# Patient Record
Sex: MALE | Race: WHITE | NOT HISPANIC OR LATINO | URBAN - METROPOLITAN AREA
[De-identification: names, ages, dates, MRNs, and addresses within clinical notes are randomized per-mention and may not be internally consistent; named-entity substitution may affect disease eponyms.]

---

## 2020-07-25 ENCOUNTER — TELEPHONE ENCOUNTER (OUTPATIENT)
Dept: URBAN - METROPOLITAN AREA CLINIC 13 | Facility: CLINIC | Age: 55
End: 2020-07-25

## 2020-07-25 RX ORDER — DEXLANSOPRAZOLE 60 MG/1
TAKE 1 CAPSULE DAILY CAPSULE, DELAYED RELEASE ORAL
Refills: 0 | OUTPATIENT
End: 2017-11-30

## 2020-07-25 RX ORDER — LINACLOTIDE 72 UG/1
TAKE 1 CAPSULE BY MOUTH DAILY 30 MINUTES BEFORE BREAKFAST CAPSULE, GELATIN COATED ORAL
Qty: 30 | Refills: 5 | OUTPATIENT
Start: 2018-09-24 | End: 2019-01-16

## 2020-07-25 RX ORDER — POLYETHYLENE GLYCOL 3350, SODIUM CHLORIDE, SODIUM BICARBONATE AND POTASSIUM CHLORIDE WITH LEMON FLAVOR 420; 11.2; 5.72; 1.48 G/4L; G/4L; G/4L; G/4L
TAKE AS DIRECTED POWDER, FOR SOLUTION ORAL
Qty: 1 | Refills: 0 | OUTPATIENT
Start: 2017-11-30 | End: 2017-12-21

## 2020-07-25 RX ORDER — MELOXICAM 7.5 MG/1
TAKE 1 TABLET TWICE DAILY AS NEEDED TABLET ORAL
Qty: 180 | Refills: 3 | OUTPATIENT
Start: 2017-11-30 | End: 2018-09-24

## 2020-07-25 RX ORDER — POLYETHYLENE GLYCOL 3350, SODIUM SULFATE, SODIUM CHLORIDE, POTASSIUM CHLORIDE, ASCORBIC ACID, SODIUM ASCORBATE 7.5-2.691G
USE AS DIRECTED KIT ORAL
Qty: 1 | Refills: 0 | OUTPATIENT
Start: 2017-11-01 | End: 2017-11-30

## 2020-07-26 ENCOUNTER — TELEPHONE ENCOUNTER (OUTPATIENT)
Dept: URBAN - METROPOLITAN AREA CLINIC 13 | Facility: CLINIC | Age: 55
End: 2020-07-26

## 2020-07-26 RX ORDER — PREDNISONE 10 MG/1
TABLET ORAL
Qty: 7 | Refills: 0 | Status: ACTIVE | COMMUNITY
Start: 2019-05-13

## 2020-07-26 RX ORDER — MELOXICAM 15 MG/1
TAKE 1 TABLET BY MOUTH TWICE A DAY AS NEEDED TABLET ORAL
Qty: 180 | Refills: 0 | Status: ACTIVE | COMMUNITY
Start: 2018-01-23

## 2020-07-26 RX ORDER — GABAPENTIN 100 MG/1
TAKE 1 TO 3 CAPSULES BY MOUTH UP TO 3 TIMES A DAY CAPSULE ORAL
Qty: 270 | Refills: 0 | Status: ACTIVE | COMMUNITY
Start: 2017-12-11

## 2020-07-26 RX ORDER — GABAPENTIN 100 MG/1
CAPSULE ORAL
Qty: 270 | Refills: 0 | Status: ACTIVE | COMMUNITY
Start: 2018-11-04

## 2020-07-26 RX ORDER — AMOXICILLIN AND CLAVULANATE POTASSIUM 875; 125 MG/1; MG/1
TABLET, FILM COATED ORAL
Qty: 20 | Refills: 0 | Status: ACTIVE | COMMUNITY
Start: 2019-05-13

## 2020-07-26 RX ORDER — AZITHROMYCIN DIHYDRATE 250 MG/1
TAKE 2 TABLETS BY MOUTH TODAY, THEN TAKE 1 TABLET DAILY FOR 4 DAYS TABLET, FILM COATED ORAL
Qty: 6 | Refills: 0 | Status: ACTIVE | COMMUNITY
Start: 2017-12-11

## 2020-07-26 RX ORDER — CYCLOBENZAPRINE HYDROCHLORIDE 10 MG/1
TABLET, FILM COATED ORAL
Qty: 30 | Refills: 0 | Status: ACTIVE | COMMUNITY
Start: 2018-02-19

## 2020-07-26 RX ORDER — PANTOPRAZOLE SODIUM 40 MG/1
TAKE 1 TABLET BY MOUTH EVERY DAY TABLET, DELAYED RELEASE ORAL
Qty: 90 | Refills: 3 | Status: ACTIVE | COMMUNITY
Start: 2017-11-30

## 2020-07-26 RX ORDER — OSELTAMIVIR PHOSPHATE 75 MG/1
CAPSULE ORAL
Qty: 10 | Refills: 0 | Status: ACTIVE | COMMUNITY
Start: 2018-11-05

## 2020-07-26 RX ORDER — MELOXICAM 15 MG/1
TABLET ORAL
Qty: 180 | Refills: 0 | Status: ACTIVE | COMMUNITY
Start: 2018-10-06

## 2020-07-26 RX ORDER — PREDNISONE 10 MG/1
TABLET ORAL
Qty: 10 | Refills: 0 | Status: ACTIVE | COMMUNITY
Start: 2018-03-19

## 2020-07-26 RX ORDER — AZITHROMYCIN DIHYDRATE 250 MG/1
TABLET, FILM COATED ORAL
Qty: 6 | Refills: 0 | Status: ACTIVE | COMMUNITY
Start: 2018-10-05

## 2020-07-26 RX ORDER — MELOXICAM 15 MG/1
TABLET ORAL
Qty: 30 | Refills: 0 | Status: ACTIVE | COMMUNITY
Start: 2019-07-19

## 2020-07-26 RX ORDER — PSYLLIUM SEED
USE AS DIRECTED PACKET (EA) ORAL
Refills: 0 | Status: ACTIVE | COMMUNITY

## 2021-03-05 ENCOUNTER — WEB ENCOUNTER (OUTPATIENT)
Dept: URBAN - METROPOLITAN AREA CLINIC 113 | Facility: CLINIC | Age: 56
End: 2021-03-05

## 2021-03-05 ENCOUNTER — OFFICE VISIT (OUTPATIENT)
Dept: URBAN - METROPOLITAN AREA CLINIC 113 | Facility: CLINIC | Age: 56
End: 2021-03-05
Payer: OTHER GOVERNMENT

## 2021-03-05 VITALS
DIASTOLIC BLOOD PRESSURE: 69 MMHG | HEART RATE: 58 BPM | HEIGHT: 70 IN | BODY MASS INDEX: 35.36 KG/M2 | WEIGHT: 247 LBS | SYSTOLIC BLOOD PRESSURE: 171 MMHG | TEMPERATURE: 98 F

## 2021-03-05 DIAGNOSIS — K31.4 GASTRIC DIVERTICULUM: ICD-10-CM

## 2021-03-05 DIAGNOSIS — R10.31 RLQ ABDOMINAL PAIN: ICD-10-CM

## 2021-03-05 DIAGNOSIS — Z86.010 HISTORY OF COLON POLYPS: ICD-10-CM

## 2021-03-05 PROCEDURE — 99213 OFFICE O/P EST LOW 20 MIN: CPT | Performed by: INTERNAL MEDICINE

## 2021-03-05 RX ORDER — OSELTAMIVIR PHOSPHATE 75 MG/1
CAPSULE ORAL
Qty: 10 | Refills: 0 | Status: ON HOLD | COMMUNITY
Start: 2018-11-05

## 2021-03-05 RX ORDER — AMOXICILLIN AND CLAVULANATE POTASSIUM 875; 125 MG/1; MG/1
TABLET, FILM COATED ORAL
Qty: 20 | Refills: 0 | Status: ON HOLD | COMMUNITY
Start: 2019-05-13

## 2021-03-05 RX ORDER — PREDNISONE 10 MG/1
TABLET ORAL
Qty: 10 | Refills: 0 | Status: ON HOLD | COMMUNITY
Start: 2018-03-19

## 2021-03-05 RX ORDER — PSYLLIUM SEED
USE AS DIRECTED PACKET (EA) ORAL
Refills: 0 | Status: ON HOLD | COMMUNITY

## 2021-03-05 RX ORDER — AZITHROMYCIN DIHYDRATE 250 MG/1
TAKE 2 TABLETS BY MOUTH TODAY, THEN TAKE 1 TABLET DAILY FOR 4 DAYS TABLET, FILM COATED ORAL
Qty: 6 | Refills: 0 | Status: ON HOLD | COMMUNITY
Start: 2017-12-11

## 2021-03-05 RX ORDER — CYCLOBENZAPRINE HYDROCHLORIDE 10 MG/1
TABLET, FILM COATED ORAL
Qty: 30 | Refills: 0 | Status: ON HOLD | COMMUNITY
Start: 2018-02-19

## 2021-03-05 RX ORDER — MELOXICAM 15 MG/1
TAKE 1 TABLET BY MOUTH TWICE A DAY AS NEEDED TABLET ORAL
Qty: 180 | Refills: 0 | Status: ON HOLD | COMMUNITY
Start: 2018-01-23

## 2021-03-05 RX ORDER — GABAPENTIN 100 MG/1
TAKE 1 TO 3 CAPSULES BY MOUTH UP TO 3 TIMES A DAY CAPSULE ORAL
Qty: 270 | Refills: 0 | Status: ON HOLD | COMMUNITY
Start: 2017-12-11

## 2021-03-05 RX ORDER — PANTOPRAZOLE SODIUM 40 MG/1
TAKE 1 TABLET BY MOUTH EVERY DAY TABLET, DELAYED RELEASE ORAL
Qty: 90 | Refills: 3 | Status: ON HOLD | COMMUNITY
Start: 2017-11-30

## 2021-03-05 NOTE — EXAM-PHYSICAL EXAM
He is alert and oriented to person place and situation no acute distress.  Abdomen is soft nondistended and nontender.   exam does not reveal any evidence for inguinal hernia on the right side.

## 2021-03-05 NOTE — HPI-TODAY'S VISIT:
The patient returns today for follow-up.  I last saw the patient in August 2019.  He had a history of a large fundic diverticulum.  He had right upper quadrant pain at that time.  There was concern that he had pills sitting in this diverticulum causing symptomatology.  He initially came to me in 2017 for evaluation of abdominal pain.  Last colonoscopy was December 2017.  This was the patient's first colonoscopy.  Patient had 2 diminutive polyps.  He was recommended a 5-year follow-up.  Last upper endoscopy was in July 2019.  This revealed some gastric erosions in the large diverticulum in the fundus of the stomach.  Biopsies of the gastric erosions were negative.  Last imaging study was December 2018 which was a CT scan with contrast.  This was unremarkable. The patient comes in with a new problem.  He states over the past few weeks he has had this vague right lower quadrant discomfort that goes around to the right flank.  Is been associated with low-grade fevers.  No diarrhea or constipation.  The discomfort is not associated with bowel movements or eating.  He is starting to feel a little bit better.  He is concerned that he may have had Covid in the past month as he was having some upper respiratory issues which are getting better.  He does have a rash but he states this is been chronic and he sees dermatology for this.

## 2021-03-30 ENCOUNTER — TELEPHONE ENCOUNTER (OUTPATIENT)
Dept: URBAN - METROPOLITAN AREA CLINIC 113 | Facility: CLINIC | Age: 56
End: 2021-03-30

## 2021-04-15 ENCOUNTER — OFFICE VISIT (OUTPATIENT)
Dept: URBAN - METROPOLITAN AREA CLINIC 107 | Facility: CLINIC | Age: 56
End: 2021-04-15

## 2021-06-15 ENCOUNTER — TELEPHONE ENCOUNTER (OUTPATIENT)
Dept: URBAN - METROPOLITAN AREA CLINIC 113 | Facility: CLINIC | Age: 56
End: 2021-06-15

## 2021-11-15 ENCOUNTER — TELEPHONE ENCOUNTER (OUTPATIENT)
Dept: URBAN - METROPOLITAN AREA CLINIC 113 | Facility: CLINIC | Age: 56
End: 2021-11-15

## 2021-12-30 ENCOUNTER — OFFICE VISIT (OUTPATIENT)
Dept: URBAN - METROPOLITAN AREA CLINIC 113 | Facility: CLINIC | Age: 56
End: 2021-12-30

## 2022-08-22 ENCOUNTER — OFFICE VISIT (OUTPATIENT)
Dept: URBAN - METROPOLITAN AREA CLINIC 107 | Facility: CLINIC | Age: 57
End: 2022-08-22
Payer: OTHER GOVERNMENT

## 2022-08-22 ENCOUNTER — TELEPHONE ENCOUNTER (OUTPATIENT)
Dept: URBAN - METROPOLITAN AREA CLINIC 113 | Facility: CLINIC | Age: 57
End: 2022-08-22

## 2022-08-22 ENCOUNTER — LAB OUTSIDE AN ENCOUNTER (OUTPATIENT)
Dept: URBAN - METROPOLITAN AREA CLINIC 107 | Facility: CLINIC | Age: 57
End: 2022-08-22

## 2022-08-22 VITALS
HEART RATE: 60 BPM | DIASTOLIC BLOOD PRESSURE: 88 MMHG | TEMPERATURE: 97.6 F | WEIGHT: 242 LBS | BODY MASS INDEX: 34.65 KG/M2 | SYSTOLIC BLOOD PRESSURE: 164 MMHG | HEIGHT: 70 IN

## 2022-08-22 DIAGNOSIS — M54.50 ACUTE RIGHT-SIDED LOW BACK PAIN, UNSPECIFIED WHETHER SCIATICA PRESENT: ICD-10-CM

## 2022-08-22 DIAGNOSIS — K31.4 GASTRIC DIVERTICULUM: ICD-10-CM

## 2022-08-22 DIAGNOSIS — Z86.010 HISTORY OF COLON POLYPS: ICD-10-CM

## 2022-08-22 DIAGNOSIS — R10.31 RLQ ABDOMINAL PAIN: ICD-10-CM

## 2022-08-22 PROCEDURE — 99214 OFFICE O/P EST MOD 30 MIN: CPT | Performed by: INTERNAL MEDICINE

## 2022-08-22 RX ORDER — PREDNISONE 10 MG/1
TABLET ORAL
Qty: 10 | Refills: 0 | Status: ON HOLD | COMMUNITY
Start: 2018-03-19

## 2022-08-22 RX ORDER — OSELTAMIVIR PHOSPHATE 75 MG/1
CAPSULE ORAL
Qty: 10 | Refills: 0 | Status: ON HOLD | COMMUNITY
Start: 2018-11-05

## 2022-08-22 RX ORDER — OMEPRAZOLE 20 MG/1
1 CAPSULE 30 MINUTES BEFORE MORNING MEAL CAPSULE, DELAYED RELEASE ORAL ONCE A DAY
Qty: 90 | Refills: 3 | OUTPATIENT
Start: 2022-08-22

## 2022-08-22 RX ORDER — CYCLOBENZAPRINE HYDROCHLORIDE 10 MG/1
TABLET, FILM COATED ORAL
Qty: 30 | Refills: 0 | Status: ON HOLD | COMMUNITY
Start: 2018-02-19

## 2022-08-22 RX ORDER — AMOXICILLIN AND CLAVULANATE POTASSIUM 875; 125 MG/1; MG/1
TABLET, FILM COATED ORAL
Qty: 20 | Refills: 0 | Status: ON HOLD | COMMUNITY
Start: 2019-05-13

## 2022-08-22 RX ORDER — GABAPENTIN 100 MG/1
TAKE 1 TO 3 CAPSULES BY MOUTH UP TO 3 TIMES A DAY CAPSULE ORAL
Qty: 270 | Refills: 0 | Status: ON HOLD | COMMUNITY
Start: 2017-12-11

## 2022-08-22 RX ORDER — PSYLLIUM SEED
USE AS DIRECTED PACKET (EA) ORAL
Refills: 0 | Status: ON HOLD | COMMUNITY

## 2022-08-22 RX ORDER — PANTOPRAZOLE SODIUM 40 MG/1
TAKE 1 TABLET BY MOUTH EVERY DAY TABLET, DELAYED RELEASE ORAL
Qty: 90 | Refills: 3 | Status: ON HOLD | COMMUNITY
Start: 2017-11-30

## 2022-08-22 RX ORDER — AZITHROMYCIN DIHYDRATE 250 MG/1
TAKE 2 TABLETS BY MOUTH TODAY, THEN TAKE 1 TABLET DAILY FOR 4 DAYS TABLET, FILM COATED ORAL
Qty: 6 | Refills: 0 | Status: ON HOLD | COMMUNITY
Start: 2017-12-11

## 2022-08-22 RX ORDER — MELOXICAM 15 MG/1
TAKE 1 TABLET BY MOUTH TWICE A DAY AS NEEDED TABLET ORAL
Qty: 180 | Refills: 0 | Status: ON HOLD | COMMUNITY
Start: 2018-01-23

## 2022-08-22 RX ORDER — SODIUM PICOSULFATE, MAGNESIUM OXIDE, AND ANHYDROUS CITRIC ACID 10; 3.5; 12 MG/160ML; G/160ML; G/160ML
160 ML LIQUID ORAL
Qty: 320 MILLILITER | Refills: 0 | OUTPATIENT
Start: 2022-08-22 | End: 2022-08-23

## 2022-08-22 NOTE — HPI-TODAY'S VISIT:
The patient returns today for follow-up.  I last saw the patient in August 2019.  He had a history of a large fundic diverticulum.  He had right upper quadrant pain at that time.  There was concern that he had pills sitting in this diverticulum causing symptomatology.  He initially came to me in 2017 for evaluation of abdominal pain.  Last colonoscopy was December 2017.  This was the patient's first colonoscopy.  Patient had 2 diminutive polyps.  He was recommended a 5-year follow-up.  Last upper endoscopy was in July 2019.  This revealed some gastric erosions in the large diverticulum in the fundus of the stomach.  Biopsies of the gastric erosions were negative.  Last imaging study was December 2018 which was a CT scan with contrast.  This was unremarkable. The patient comes in with a new problem.  He states over the past few weeks he has had this vague right lower quadrant discomfort that goes around to the right flank.  Is been associated with low-grade fevers.  No diarrhea or constipation.  The discomfort is not associated with bowel movements or eating.  He is starting to feel a little bit better.  He is concerned that he may have had Covid in the past month as he was having some upper respiratory issues which are getting better.  He does have a rash but he states this is been chronic and he sees dermatology for this. Interval history.  The patient states from a gastrointestinal standpoint he is done quite well over the last year with no new gastrointestinal issues.  He states though over the last 3 weeks he has developed severe low back pain.  He is asking for referral to neurosurgery.  He states he has seen his primary care physician and that he has been placed on prednisone and a nonsteroidal anti-inflammatory agent.  He also takes meloxicam.

## 2022-11-17 ENCOUNTER — OFFICE VISIT (OUTPATIENT)
Dept: URBAN - METROPOLITAN AREA SURGERY CENTER 25 | Facility: SURGERY CENTER | Age: 57
End: 2022-11-17

## 2022-11-17 ENCOUNTER — CLAIMS CREATED FROM THE CLAIM WINDOW (OUTPATIENT)
Dept: URBAN - METROPOLITAN AREA CLINIC 4 | Facility: CLINIC | Age: 57
End: 2022-11-17
Payer: OTHER GOVERNMENT

## 2022-11-17 ENCOUNTER — CLAIMS CREATED FROM THE CLAIM WINDOW (OUTPATIENT)
Dept: URBAN - METROPOLITAN AREA SURGERY CENTER 25 | Facility: SURGERY CENTER | Age: 57
End: 2022-11-17
Payer: OTHER GOVERNMENT

## 2022-11-17 DIAGNOSIS — D12.6 COLON ADENOMAS: ICD-10-CM

## 2022-11-17 DIAGNOSIS — Z86.010 HISTORY OF COLON POLYPS: ICD-10-CM

## 2022-11-17 DIAGNOSIS — K63.5 HYPERPLASTIC COLON POLYP: ICD-10-CM

## 2022-11-17 DIAGNOSIS — D12.2 BENIGN NEOPLASM OF ASCENDING COLON: ICD-10-CM

## 2022-11-17 DIAGNOSIS — D12.3 BENIGN NEOPLASM OF TRANSVERSE COLON: ICD-10-CM

## 2022-11-17 PROCEDURE — 45385 COLONOSCOPY W/LESION REMOVAL: CPT | Performed by: INTERNAL MEDICINE

## 2022-11-17 PROCEDURE — G8907 PT DOC NO EVENTS ON DISCHARG: HCPCS | Performed by: INTERNAL MEDICINE

## 2022-11-17 PROCEDURE — 45380 COLONOSCOPY AND BIOPSY: CPT | Performed by: INTERNAL MEDICINE

## 2022-11-17 PROCEDURE — 88305 TISSUE EXAM BY PATHOLOGIST: CPT | Performed by: PATHOLOGY

## 2022-11-17 RX ORDER — PANTOPRAZOLE SODIUM 40 MG/1
TAKE 1 TABLET BY MOUTH EVERY DAY TABLET, DELAYED RELEASE ORAL
Qty: 90 | Refills: 3 | Status: ON HOLD | COMMUNITY
Start: 2017-11-30

## 2022-11-17 RX ORDER — OMEPRAZOLE 20 MG/1
1 CAPSULE 30 MINUTES BEFORE MORNING MEAL CAPSULE, DELAYED RELEASE ORAL ONCE A DAY
Qty: 90 | Refills: 3 | Status: ACTIVE | COMMUNITY
Start: 2022-08-22

## 2022-11-17 RX ORDER — OSELTAMIVIR PHOSPHATE 75 MG/1
CAPSULE ORAL
Qty: 10 | Refills: 0 | Status: ON HOLD | COMMUNITY
Start: 2018-11-05

## 2022-11-17 RX ORDER — PSYLLIUM SEED
USE AS DIRECTED PACKET (EA) ORAL
Refills: 0 | Status: ON HOLD | COMMUNITY

## 2022-11-17 RX ORDER — MELOXICAM 15 MG/1
TAKE 1 TABLET BY MOUTH TWICE A DAY AS NEEDED TABLET ORAL
Qty: 180 | Refills: 0 | Status: ON HOLD | COMMUNITY
Start: 2018-01-23

## 2022-11-17 RX ORDER — GABAPENTIN 100 MG/1
TAKE 1 TO 3 CAPSULES BY MOUTH UP TO 3 TIMES A DAY CAPSULE ORAL
Qty: 270 | Refills: 0 | Status: ON HOLD | COMMUNITY
Start: 2017-12-11

## 2022-11-17 RX ORDER — AMOXICILLIN AND CLAVULANATE POTASSIUM 875; 125 MG/1; MG/1
TABLET, FILM COATED ORAL
Qty: 20 | Refills: 0 | Status: ON HOLD | COMMUNITY
Start: 2019-05-13

## 2022-11-17 RX ORDER — PREDNISONE 10 MG/1
TABLET ORAL
Qty: 10 | Refills: 0 | Status: ON HOLD | COMMUNITY
Start: 2018-03-19

## 2022-11-17 RX ORDER — CYCLOBENZAPRINE HYDROCHLORIDE 10 MG/1
TABLET, FILM COATED ORAL
Qty: 30 | Refills: 0 | Status: ON HOLD | COMMUNITY
Start: 2018-02-19

## 2022-11-17 RX ORDER — AZITHROMYCIN DIHYDRATE 250 MG/1
TAKE 2 TABLETS BY MOUTH TODAY, THEN TAKE 1 TABLET DAILY FOR 4 DAYS TABLET, FILM COATED ORAL
Qty: 6 | Refills: 0 | Status: ON HOLD | COMMUNITY
Start: 2017-12-11

## 2022-12-27 PROBLEM — 428283002: Status: ACTIVE | Noted: 2021-03-05

## 2023-06-26 ENCOUNTER — DASHBOARD ENCOUNTERS (OUTPATIENT)
Age: 58
End: 2023-06-26

## 2023-06-26 ENCOUNTER — OFFICE VISIT (OUTPATIENT)
Dept: URBAN - METROPOLITAN AREA CLINIC 113 | Facility: CLINIC | Age: 58
End: 2023-06-26
Payer: OTHER GOVERNMENT

## 2023-06-26 VITALS
HEART RATE: 59 BPM | SYSTOLIC BLOOD PRESSURE: 164 MMHG | BODY MASS INDEX: 33.93 KG/M2 | DIASTOLIC BLOOD PRESSURE: 91 MMHG | WEIGHT: 237 LBS | TEMPERATURE: 97.8 F | RESPIRATION RATE: 16 BRPM | HEIGHT: 70 IN

## 2023-06-26 DIAGNOSIS — Z86.010 HISTORY OF COLON POLYPS: ICD-10-CM

## 2023-06-26 DIAGNOSIS — R10.31 RLQ ABDOMINAL PAIN: ICD-10-CM

## 2023-06-26 DIAGNOSIS — K31.4 GASTRIC DIVERTICULUM: ICD-10-CM

## 2023-06-26 DIAGNOSIS — B02.8 HERPES ZOSTER WITH COMPLICATION: ICD-10-CM

## 2023-06-26 PROCEDURE — 99214 OFFICE O/P EST MOD 30 MIN: CPT | Performed by: INTERNAL MEDICINE

## 2023-06-26 RX ORDER — MELOXICAM 15 MG/1
TAKE 1 TABLET BY MOUTH TWICE A DAY AS NEEDED TABLET ORAL
Qty: 180 | Refills: 0 | Status: ON HOLD | COMMUNITY
Start: 2018-01-23

## 2023-06-26 RX ORDER — PANTOPRAZOLE SODIUM 40 MG/1
TAKE 1 TABLET BY MOUTH EVERY DAY TABLET, DELAYED RELEASE ORAL
Qty: 90 | Refills: 3 | Status: ON HOLD | COMMUNITY
Start: 2017-11-30

## 2023-06-26 RX ORDER — AZITHROMYCIN DIHYDRATE 250 MG/1
TAKE 2 TABLETS BY MOUTH TODAY, THEN TAKE 1 TABLET DAILY FOR 4 DAYS TABLET, FILM COATED ORAL
Qty: 6 | Refills: 0 | Status: ON HOLD | COMMUNITY
Start: 2017-12-11

## 2023-06-26 RX ORDER — PREDNISONE 10 MG/1
TABLET ORAL
Qty: 10 | Refills: 0 | Status: ON HOLD | COMMUNITY
Start: 2018-03-19

## 2023-06-26 RX ORDER — OSELTAMIVIR PHOSPHATE 75 MG/1
CAPSULE ORAL
Qty: 10 | Refills: 0 | Status: ON HOLD | COMMUNITY
Start: 2018-11-05

## 2023-06-26 RX ORDER — VALACYCLOVIR 1 G/1
1 TABLET TABLET, FILM COATED ORAL THREE TIMES A DAY
Qty: 21 TABLET | Refills: 0 | OUTPATIENT
Start: 2023-06-26 | End: 2023-07-06

## 2023-06-26 RX ORDER — OMEPRAZOLE 20 MG/1
1 CAPSULE 30 MINUTES BEFORE MORNING MEAL CAPSULE, DELAYED RELEASE ORAL ONCE A DAY
Qty: 90 | Refills: 3 | Status: ACTIVE | COMMUNITY
Start: 2022-08-22

## 2023-06-26 RX ORDER — AMOXICILLIN AND CLAVULANATE POTASSIUM 875; 125 MG/1; MG/1
TABLET, FILM COATED ORAL
Qty: 20 | Refills: 0 | Status: ON HOLD | COMMUNITY
Start: 2019-05-13

## 2023-06-26 RX ORDER — GABAPENTIN 100 MG/1
TAKE 1 TO 3 CAPSULES BY MOUTH UP TO 3 TIMES A DAY CAPSULE ORAL
Qty: 270 | Refills: 0 | Status: ON HOLD | COMMUNITY
Start: 2017-12-11

## 2023-06-26 RX ORDER — LOSARTAN POTASSIUM 25 MG/1
1 TABLET TABLET ORAL ONCE A DAY
Status: ACTIVE | COMMUNITY

## 2023-06-26 RX ORDER — PSYLLIUM SEED
USE AS DIRECTED PACKET (EA) ORAL
Refills: 0 | Status: ON HOLD | COMMUNITY

## 2023-06-26 RX ORDER — CYCLOBENZAPRINE HYDROCHLORIDE 10 MG/1
TABLET, FILM COATED ORAL
Qty: 30 | Refills: 0 | Status: ON HOLD | COMMUNITY
Start: 2018-02-19

## 2023-06-26 NOTE — HPI-TODAY'S VISIT:
The patient returns today for follow-up.  I last saw the patient in August 2019.  He had a history of a large fundic diverticulum.  He had right upper quadrant pain at that time.  There was concern that he had pills sitting in this diverticulum causing symptomatology.  He initially came to me in 2017 for evaluation of abdominal pain.  Last colonoscopy was December 2017.  This was the patient's first colonoscopy.  Patient had 2 diminutive polyps.  He was recommended a 5-year follow-up.  Last upper endoscopy was in July 2019.  This revealed some gastric erosions in the large diverticulum in the fundus of the stomach.  Biopsies of the gastric erosions were negative.  Last imaging study was December 2018 which was a CT scan with contrast.  This was unremarkable. The patient comes in with a new problem.  He states over the past few weeks he has had this vague right lower quadrant discomfort that goes around to the right flank.  Is been associated with low-grade fevers.  No diarrhea or constipation.  The discomfort is not associated with bowel movements or eating.  He is starting to feel a little bit better.  He is concerned that he may have had Covid in the past month as he was having some upper respiratory issues which are getting better.  He does have a rash but he states this is been chronic and he sees dermatology for this. Interval history.  The patient states from a gastrointestinal standpoint he is done quite well over the last year with no new gastrointestinal issues.  He states though over the last 3 weeks he has developed severe low back pain.  He is asking for referral to neurosurgery.  He states he has seen his primary care physician and that he has been placed on prednisone and a nonsteroidal anti-inflammatory agent.  He also takes meloxicam. Interval history, 6/26/2023.  Colonoscopy performed November 2022 revealed a 2 mm polyp in the ascending colon and 3 other polyps in the descending and transverse colon.  Pathology of these polyps returned as tubular adenomas and hyperplastic polyps.  Repeat colonoscopy in 5 to 7 years. Laboratory testing from primary care physician was reviewed.  This testing was from January of this year.  CBC was unremarkable other than elevated hemoglobin of 17.4 and slight increased percent eosinophils at 7.8%.  Hemoglobin A1c was 5.9.  CMP was unremarkable.  PSA was 2.9. The patient comes in today with a new problem of right back and flank pain radiating around to his epigastric region.  This started within the last week.  He has now noticed a rash develop.  He is worried about shingles.  He was initially worried this could be pain from his gastric diverticulum but he has been compliant avoiding large over-the-counter supplement pills.

## 2023-06-26 NOTE — EXAM-PHYSICAL EXAM
He is alert and oriented to person place and situation no acute distress.  Patient does have a dermatomal papular rash on the right side.  It does not cross the midline.

## 2023-07-05 ENCOUNTER — TELEPHONE ENCOUNTER (OUTPATIENT)
Dept: URBAN - METROPOLITAN AREA CLINIC 113 | Facility: CLINIC | Age: 58
End: 2023-07-05

## 2023-07-05 RX ORDER — VALACYCLOVIR 1 G/1
1 TABLET TABLET, FILM COATED ORAL THREE TIMES A DAY
Qty: 21 TABLET | Refills: 0
Start: 2023-06-26 | End: 2023-07-12

## 2024-05-13 ENCOUNTER — TELEPHONE ENCOUNTER (OUTPATIENT)
Dept: URBAN - METROPOLITAN AREA CLINIC 113 | Facility: CLINIC | Age: 59
End: 2024-05-13

## 2024-05-13 RX ORDER — PANTOPRAZOLE SODIUM 40 MG/1
1 TABLET TABLET, DELAYED RELEASE ORAL ONCE A DAY
Qty: 90 TABLET | Refills: 0
Start: 2017-11-30

## 2024-07-08 ENCOUNTER — OFFICE VISIT (OUTPATIENT)
Dept: URBAN - METROPOLITAN AREA CLINIC 113 | Facility: CLINIC | Age: 59
End: 2024-07-08

## 2025-01-15 ENCOUNTER — TELEPHONE ENCOUNTER (OUTPATIENT)
Dept: URBAN - METROPOLITAN AREA CLINIC 113 | Facility: CLINIC | Age: 60
End: 2025-01-15

## 2025-01-17 ENCOUNTER — OFFICE VISIT (OUTPATIENT)
Dept: URBAN - METROPOLITAN AREA CLINIC 113 | Facility: CLINIC | Age: 60
End: 2025-01-17
Payer: COMMERCIAL

## 2025-01-17 VITALS
RESPIRATION RATE: 18 BRPM | BODY MASS INDEX: 34.93 KG/M2 | TEMPERATURE: 97.5 F | SYSTOLIC BLOOD PRESSURE: 146 MMHG | HEART RATE: 66 BPM | DIASTOLIC BLOOD PRESSURE: 84 MMHG | WEIGHT: 244 LBS | HEIGHT: 70 IN

## 2025-01-17 DIAGNOSIS — R10.13 EPIGASTRIC ABDOMINAL PAIN: ICD-10-CM

## 2025-01-17 DIAGNOSIS — K31.4 GASTRIC DIVERTICULUM: ICD-10-CM

## 2025-01-17 DIAGNOSIS — R10.31 RLQ ABDOMINAL PAIN: ICD-10-CM

## 2025-01-17 DIAGNOSIS — Z86.0101 HISTORY OF ADENOMATOUS POLYP OF COLON: ICD-10-CM

## 2025-01-17 PROCEDURE — 99214 OFFICE O/P EST MOD 30 MIN: CPT | Performed by: INTERNAL MEDICINE

## 2025-01-17 RX ORDER — PREDNISONE 10 MG/1
TABLET ORAL
Qty: 10 | Refills: 0 | Status: ON HOLD | COMMUNITY
Start: 2018-03-19

## 2025-01-17 RX ORDER — PSYLLIUM SEED
USE AS DIRECTED PACKET (EA) ORAL
Refills: 0 | Status: ON HOLD | COMMUNITY

## 2025-01-17 RX ORDER — GABAPENTIN 100 MG/1
TAKE 1 TO 3 CAPSULES BY MOUTH UP TO 3 TIMES A DAY CAPSULE ORAL
Qty: 270 | Refills: 0 | Status: ON HOLD | COMMUNITY
Start: 2017-12-11

## 2025-01-17 RX ORDER — OMEPRAZOLE 20 MG/1
1 CAPSULE 30 MINUTES BEFORE MORNING MEAL CAPSULE, DELAYED RELEASE ORAL ONCE A DAY
Qty: 90 | Refills: 3 | Status: ACTIVE | COMMUNITY
Start: 2022-08-22

## 2025-01-17 RX ORDER — MELOXICAM 15 MG/1
TAKE 1 TABLET BY MOUTH TWICE A DAY AS NEEDED TABLET ORAL
Qty: 180 | Refills: 0 | Status: ON HOLD | COMMUNITY
Start: 2018-01-23

## 2025-01-17 RX ORDER — AZITHROMYCIN DIHYDRATE 250 MG/1
TAKE 2 TABLETS BY MOUTH TODAY, THEN TAKE 1 TABLET DAILY FOR 4 DAYS TABLET, FILM COATED ORAL
Qty: 6 | Refills: 0 | Status: ON HOLD | COMMUNITY
Start: 2017-12-11

## 2025-01-17 RX ORDER — LOSARTAN POTASSIUM 25 MG/1
1 TABLET TABLET ORAL ONCE A DAY
Status: ACTIVE | COMMUNITY

## 2025-01-17 RX ORDER — OSELTAMIVIR PHOSPHATE 75 MG/1
CAPSULE ORAL
Qty: 10 | Refills: 0 | Status: ON HOLD | COMMUNITY
Start: 2018-11-05

## 2025-01-17 RX ORDER — AMOXICILLIN AND CLAVULANATE POTASSIUM 875; 125 MG/1; MG/1
TABLET, FILM COATED ORAL
Qty: 20 | Refills: 0 | Status: ON HOLD | COMMUNITY
Start: 2019-05-13

## 2025-01-17 RX ORDER — PANTOPRAZOLE SODIUM 40 MG/1
1 TABLET TABLET, DELAYED RELEASE ORAL ONCE A DAY
Qty: 90 TABLET | Refills: 0 | Status: ACTIVE | COMMUNITY
Start: 2017-11-30

## 2025-01-17 RX ORDER — AMLODIPINE BESYLATE 5 MG/1
TABLET ORAL
Qty: 90 TABLET | Status: ACTIVE | COMMUNITY

## 2025-01-17 RX ORDER — CYCLOBENZAPRINE HYDROCHLORIDE 10 MG/1
TABLET, FILM COATED ORAL
Qty: 30 | Refills: 0 | Status: ON HOLD | COMMUNITY
Start: 2018-02-19

## 2025-01-17 NOTE — HPI-TODAY'S VISIT:
The patient returns today for follow-up.  I last saw the patient in August 2019.  He had a history of a large fundic diverticulum.  He had right upper quadrant pain at that time.  There was concern that he had pills sitting in this diverticulum causing symptomatology.  He initially came to me in 2017 for evaluation of abdominal pain.  Last colonoscopy was December 2017.  This was the patient's first colonoscopy.  Patient had 2 diminutive polyps.  He was recommended a 5-year follow-up.  Last upper endoscopy was in July 2019.  This revealed some gastric erosions in the large diverticulum in the fundus of the stomach.  Biopsies of the gastric erosions were negative.  Last imaging study was December 2018 which was a CT scan with contrast.  This was unremarkable. The patient comes in with a new problem.  He states over the past few weeks he has had this vague right lower quadrant discomfort that goes around to the right flank.  Is been associated with low-grade fevers.  No diarrhea or constipation.  The discomfort is not associated with bowel movements or eating.  He is starting to feel a little bit better.  He is concerned that he may have had Covid in the past month as he was having some upper respiratory issues which are getting better.  He does have a rash but he states this is been chronic and he sees dermatology for this. Interval history.  The patient states from a gastrointestinal standpoint he is done quite well over the last year with no new gastrointestinal issues.  He states though over the last 3 weeks he has developed severe low back pain.  He is asking for referral to neurosurgery.  He states he has seen his primary care physician and that he has been placed on prednisone and a nonsteroidal anti-inflammatory agent.  He also takes meloxicam. Interval history, 6/26/2023.  Colonoscopy performed November 2022 revealed a 2 mm polyp in the ascending colon and 3 other polyps in the descending and transverse colon.  Pathology of these polyps returned as tubular adenomas and hyperplastic polyps.  Repeat colonoscopy in 5 to 7 years. Laboratory testing from primary care physician was reviewed.  This testing was from January of this year.  CBC was unremarkable other than elevated hemoglobin of 17.4 and slight increased percent eosinophils at 7.8%.  Hemoglobin A1c was 5.9.  CMP was unremarkable.  PSA was 2.9. The patient comes in today with a new problem of right back and flank pain radiating around to his epigastric region.  This started within the last week.  He has now noticed a rash develop.  He is worried about shingles.  He was initially worried this could be pain from his gastric diverticulum but he has been compliant avoiding large over-the-counter supplement pills. Interval history, 1/17/2025. The patient states he comes in for the similar symptoms for which we performed upper endoscopy in 2019 and found a large gastric diverticulum and that his pain in the epigastric region.  He states it was coming and going but now has resolved.  He does admit that he return to taking over-the-counter capsule supplements.  He does note that when he takes his Metamucil he is less likely to have a problem.  He is back on his Metamucil.  He denies any new past medical or past surgical history since I last saw him.

## 2025-03-14 ENCOUNTER — OFFICE VISIT (OUTPATIENT)
Dept: URBAN - METROPOLITAN AREA CLINIC 113 | Facility: CLINIC | Age: 60
End: 2025-03-14